# Patient Record
Sex: MALE | Race: WHITE | NOT HISPANIC OR LATINO | Employment: UNEMPLOYED | URBAN - NONMETROPOLITAN AREA
[De-identification: names, ages, dates, MRNs, and addresses within clinical notes are randomized per-mention and may not be internally consistent; named-entity substitution may affect disease eponyms.]

---

## 2022-11-11 ENCOUNTER — APPOINTMENT (OUTPATIENT)
Dept: RADIOLOGY | Facility: CLINIC | Age: 22
End: 2022-11-11

## 2022-11-11 DIAGNOSIS — Z00.8 HEALTH EXAMINATION IN POPULATION SURVEY: ICD-10-CM

## 2023-02-10 ENCOUNTER — HOSPITAL ENCOUNTER (EMERGENCY)
Facility: HOSPITAL | Age: 23
Discharge: HOME | End: 2023-02-10
Attending: EMERGENCY MEDICINE

## 2023-02-10 ENCOUNTER — APPOINTMENT (EMERGENCY)
Dept: ULTRASOUND IMAGING | Facility: HOSPITAL | Age: 23
End: 2023-02-10

## 2023-02-10 VITALS
BODY MASS INDEX: 28.63 KG/M2 | RESPIRATION RATE: 16 BRPM | HEIGHT: 70 IN | WEIGHT: 200 LBS | TEMPERATURE: 99 F | OXYGEN SATURATION: 98 % | SYSTOLIC BLOOD PRESSURE: 123 MMHG | HEART RATE: 98 BPM | DIASTOLIC BLOOD PRESSURE: 72 MMHG

## 2023-02-10 DIAGNOSIS — N50.812 LEFT TESTICULAR PAIN: ICD-10-CM

## 2023-02-10 DIAGNOSIS — N45.1 EPIDIDYMITIS: Primary | ICD-10-CM

## 2023-02-10 RX ORDER — NAPROXEN 500 MG/1
500 TABLET ORAL 2 TIMES DAILY PRN
Qty: 30 TABLET | Refills: 0 | Status: SHIPPED | OUTPATIENT
Start: 2023-02-10

## 2023-02-10 RX ORDER — DOXYCYCLINE HYCLATE 100 MG/1
100 CAPSULE ORAL 2 TIMES DAILY
Qty: 14 CAPSULE | Refills: 0 | Status: SHIPPED | OUTPATIENT
Start: 2023-02-10 | End: 2023-02-17

## 2023-02-10 NOTE — DISCHARGE INSTRUCTIONS
Take medications as prescribed  We will contact you with the results of your testing and [facility] is [facility]  Return with any worsening symptoms  Thank you for choosing the emergency department at [facility]  We appreciated the opportunity and privilege to address your healthcare needs  We remain available to you should you require additional evaluation or assistance  We value your feedback and would appreciate the opportunity to address anything you identified as an opportunity to improve or where we excelled  If there are colleagues who deserve special recognition, please let us know! We hope you are feeling better soon! Please also note that sometimes there are subtle abnormalities in your lab values that you may observe when you access your record online  These are frequently not worrisome and if they are of concern we will have discussed them with you  However, we always encourage that you discuss any concerns you may have or observe on your record with your primary care provider  Please also be aware that voice transcription will occasionally recognize words or grammar differently than what was spoken

## 2023-02-10 NOTE — ED PROVIDER NOTES
History  Chief Complaint   Patient presents with   • Testicle Pain     Pt reports intermittent left testicle pain that is now continuously painful, reports worsening pain when moving left leg, denies redness/discharge     Pleasant 70-year-old male presents emergency room with chief complaint of left testicular pain  Patient is having sharp episodic type of left testicular pain ongoing since about Wednesday  Those are coming on more rapidly  No nausea or vomiting  No fever  No diarrhea  No lesions  No discharge  Monogamous relationship for the last 2 years  History provided by:  Patient  Testicle Pain  Location:  Left testicle  Quality:  Sharp and shooting lasting about 15 to 20 seconds  Severity:  Severe  Onset quality:  Gradual  Timing:  Sporadic  Chronicity:  New  Associated symptoms: no abdominal pain, no chest pain, no congestion, no diarrhea, no fatigue and no fever        None       No past medical history on file  No past surgical history on file  No family history on file  I have reviewed and agree with the history as documented  E-Cigarette/Vaping   • E-Cigarette Use Current Every Day User      E-Cigarette/Vaping Substances   • Nicotine Yes    • Flavoring Yes      Social History     Tobacco Use   • Smoking status: Never   • Smokeless tobacco: Never   Vaping Use   • Vaping Use: Every day   • Substances: Nicotine, Flavoring   Substance Use Topics   • Alcohol use: Not Currently   • Drug use: Not Currently       Review of Systems   Constitutional: Negative for chills, fatigue and fever  HENT: Negative  Negative for congestion  Respiratory: Negative  Cardiovascular: Negative  Negative for chest pain  Gastrointestinal: Negative  Negative for abdominal pain and diarrhea  Genitourinary: Positive for scrotal swelling and testicular pain   Negative for decreased urine volume, difficulty urinating, dysuria, enuresis, flank pain, frequency, genital sores, hematuria, penile discharge, penile pain, penile swelling and urgency  Musculoskeletal: Negative for arthralgias and back pain  All other systems reviewed and are negative  Physical Exam  Physical Exam  Vitals and nursing note reviewed  Constitutional:       General: He is not in acute distress  Appearance: He is normal weight  He is not ill-appearing or toxic-appearing  HENT:      Head: Normocephalic and atraumatic  Right Ear: External ear normal       Left Ear: External ear normal       Nose: Nose normal       Mouth/Throat:      Mouth: Mucous membranes are moist    Eyes:      Conjunctiva/sclera: Conjunctivae normal    Cardiovascular:      Rate and Rhythm: Normal rate  Pulmonary:      Effort: Pulmonary effort is normal  No respiratory distress  Abdominal:      General: Abdomen is flat  Genitourinary:     Penis: Normal and circumcised  Testes:         Right: Tenderness, swelling or testicular hydrocele not present  Left: Tenderness present  Swelling or testicular hydrocele not present  Musculoskeletal:         General: No tenderness  Right lower leg: No edema  Left lower leg: No edema  Skin:     General: Skin is warm  Neurological:      General: No focal deficit present  Mental Status: He is alert and oriented to person, place, and time  Psychiatric:         Mood and Affect: Mood normal          Thought Content:  Thought content normal          Judgment: Judgment normal          Vital Signs  ED Triage Vitals [02/10/23 1216]   Temperature Pulse Respirations Blood Pressure SpO2   99 °F (37 2 °C) (!) 116 20 150/100 98 %      Temp Source Heart Rate Source Patient Position - Orthostatic VS BP Location FiO2 (%)   Temporal Monitor Standing Left arm --      Pain Score       6           Vitals:    02/10/23 1216 02/10/23 1422   BP: 150/100 123/72   Pulse: (!) 116 98   Patient Position - Orthostatic VS: Standing Sitting         Visual Acuity      ED Medications  Medications - No data to display    Diagnostic Studies  Results Reviewed     Procedure Component Value Units Date/Time    Chlamydia/GC amplified DNA by PCR [[id number]] Collected: 02/10/23 1250    Lab Status: In process Specimen: Urine, Other Updated: 02/10/23 1253                 US scrotum and testicles   Final Result by Jose A Oliver MD (02/10 1401)       Normal        Workstation performed: [id number]                    Procedures  Procedures         ED Course  ED Course as of 02/10/23 1604   Fri Feb 10, 2023   1455  ultrasound imaging is negative for acute process  Medical Decision Making  Patient presented to the emergency department and a MSE was performed  The patient was evaluated for complaint related to acute left testicular pain  Differential diagnoses included, but are not limited to, torsion of testes,torsion of testicular appendages, testicular hematoma, thrombosed varicocele, inguinoscrotal hernia (obstructed/strangulated), epididymitis, epididymo-orchitis,  Several of these diagnoses have been evaluated and ruled out by history and physical   As needed, patient will be further evaluated with laboratory and imaging studies  Higher level diagnostics, such as CT imaging or ultrasound, may also be required  Please see work-up portion of the note for further evaluation of patient's risk  Socioeconomic factors were also considered as part of the decision-making process  Unless otherwise stated in the chart or patient is admitted as elsewhere documented, any deviously prescribed medications will be maintained  Epididymitis: complicated acute illness or injury with systemic symptoms  Left testicular pain: complicated acute illness or injury with systemic symptoms  Amount and/or Complexity of Data Reviewed  Labs: ordered  Radiology: ordered  Risk  Prescription drug management  Decision regarding hospitalization            Disposition  Final diagnoses: Epididymitis   Left testicular pain     Time reflects when diagnosis was documented in both MDM as applicable and the Disposition within this note     Time User Action Codes Description Comment    2/10/2023  2:55 PM Fe Ashutosh Add [N45 1] Epididymitis     2/10/2023  2:59 PM Fe Boykin Add [H28 145] Left testicular pain       ED Disposition     ED Disposition   Discharge    Condition   Stable    Date/Time   Fri Feb 10, 2023  2:55 PM    Comment   Alla Marshall discharge to home/self care                 Follow-up Information     Follow up With Specialties Details Why Contact Info Additional [facility] Urology [facility], [facility]  Urology In 1 week If not better [address] [facility] [id number]  [place] [phone] Urology [facility], [facility] , [address] [place] [address], [address] [place] [address], [address] [facility], [facility], [facility] , [address]          Discharge Medication List as of 2/10/2023  2:57 PM      START taking these medications    Details   doxycycline hyclate (VIBRAMYCIN) 100 mg capsule Take 1 capsule (100 mg total) by mouth 2 (two) times a day for 7 days, Starting Fri 2/10/2023, Until Fri 2/17/2023, Normal      naproxen (NAPROSYN) 500 mg tablet Take 1 tablet (500 mg total) by mouth 2 (two) times a day as needed for mild pain or moderate pain, Starting Fri 2/10/2023, Normal                 PDMP Review     None          ED Provider  Electronically Signed by           Ramesh Mckeon DO  02/10/23 DO Gerry  02/10/23 0896

## 2023-02-11 LAB
C TRACH DNA SPEC QL NAA+PROBE: NEGATIVE
N GONORRHOEA DNA SPEC QL NAA+PROBE: NEGATIVE

## 2023-02-13 ENCOUNTER — TELEPHONE (OUTPATIENT)
Dept: CARDIOLOGY CLINIC | Facility: CLINIC | Age: 23
End: 2023-02-13